# Patient Record
Sex: MALE | Race: BLACK OR AFRICAN AMERICAN | NOT HISPANIC OR LATINO | Employment: UNEMPLOYED | URBAN - METROPOLITAN AREA
[De-identification: names, ages, dates, MRNs, and addresses within clinical notes are randomized per-mention and may not be internally consistent; named-entity substitution may affect disease eponyms.]

---

## 2020-10-22 ENCOUNTER — HOSPITAL ENCOUNTER (EMERGENCY)
Facility: HOSPITAL | Age: 25
Discharge: HOME | End: 2020-10-22
Attending: EMERGENCY MEDICINE | Admitting: EMERGENCY MEDICINE

## 2020-10-22 VITALS
OXYGEN SATURATION: 98 % | WEIGHT: 315 LBS | SYSTOLIC BLOOD PRESSURE: 109 MMHG | HEART RATE: 87 BPM | TEMPERATURE: 96.2 F | DIASTOLIC BLOOD PRESSURE: 74 MMHG | RESPIRATION RATE: 16 BRPM

## 2020-10-22 DIAGNOSIS — R68.84 JAW PAIN: Primary | ICD-10-CM

## 2020-10-22 DIAGNOSIS — Z23 NEED FOR TETANUS BOOSTER: ICD-10-CM

## 2020-10-22 PROCEDURE — 99283 EMERGENCY DEPT VISIT LOW MDM: CPT

## 2020-10-22 PROCEDURE — 90471 IMMUNIZATION ADMIN: CPT

## 2020-10-22 PROCEDURE — 90715 TDAP VACCINE 7 YRS/> IM: CPT | Performed by: PHYSICIAN ASSISTANT

## 2020-10-22 PROCEDURE — 99282 EMERGENCY DEPT VISIT SF MDM: CPT | Performed by: PHYSICIAN ASSISTANT

## 2020-10-22 RX ADMIN — TETANUS TOXOID, REDUCED DIPHTHERIA TOXOID AND ACELLULAR PERTUSSIS VACCINE, ADSORBED 0.5 ML: 5; 2.5; 8; 8; 2.5 SUSPENSION INTRAMUSCULAR at 22:11

## 2023-04-30 ENCOUNTER — HOSPITAL ENCOUNTER (EMERGENCY)
Facility: HOSPITAL | Age: 28
Discharge: HOME | End: 2023-04-30
Attending: EMERGENCY MEDICINE

## 2023-04-30 VITALS
SYSTOLIC BLOOD PRESSURE: 128 MMHG | TEMPERATURE: 97.9 F | RESPIRATION RATE: 16 BRPM | OXYGEN SATURATION: 97 % | DIASTOLIC BLOOD PRESSURE: 64 MMHG | HEART RATE: 73 BPM

## 2023-04-30 DIAGNOSIS — Z77.098 CHEMICAL EXPOSURE: Primary | ICD-10-CM

## 2023-04-30 NOTE — Clinical Note
Mary Bethdavid Tali was seen and treated in our emergency department on 4/30/2023  Occupational Medicine Follow Up Within 24 hours            Diagnosis:     Marilynn Martinez    He may return on this date: If you have any questions or concerns, please don't hesitate to call        Sofia Dominguez MD    ______________________________           _______________          _______________  Hospital Representative                              Date                                Time

## 2023-05-01 NOTE — DISCHARGE INSTRUCTIONS
Your workup here was not concerning for anything dangerous  Therefore there is no need for you to stay at the hospital for further testing  We feel safe to send you home  You should follow up with Occupational health within 24 hrs  You can use Tylenol, Motrin for management of your symptoms  You should follow up with Tylenol, Motrin to assess for resolution of your symptoms and to determine if there is any further evaluation that needs to be performed  Return to the emergency department if you have any symptoms of worsening pain or new symptoms    Thank you for choosing [address] [place] [address] for your care!

## 2023-05-01 NOTE — ED PROVIDER NOTES
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 and came in contact with some sort of chemical that caused burning to his left 2nd, 3rd and 4th digit  Pt scrubbed the digits for about half an hour but had a hard time removing the chemical from his fingers  History of Present Illness and Review of Systems   This is a 24 y o  male with no significant PMH coming in today with complaint of chemical burn  He reports that he was working as a  and came in contact with multiple chemicals at his work  He it impacted the tips of his left third second and fourth digits  He noted some pain immediately at that time  He immediately degloved and changes close, and irrigated his hands  He notes some residual burning sensation at the tips of his fingers  Denies any inhalational exposure  Denies any eye exposure  Denies any for hydrofluoric acid exposure  Does not feel any palpitations, no episodes of syncope  He is otherwise healthy, no chronic medical problems  No other symptoms currently  He does have an occupational person at work that he can follow-up with  In reviewing the safety data sheet, it appears that most of the chemicals that he may have been exposed to are alkaline  He is unable to note exactly which one as he worked with multiple chemicals  Most likely, related to a 10% hydrogen peroxide powder  Remainder of ROS Reviewed and Non-Pertinent    No other complaints for this encounter     - No language barrier    - History obtained from patient and chart   - Reviewed relevant past medical/family/social history  - There are no limitations to the history obtained  Past Medical, Past Surgical History:    has no past medical history on file  has no past surgical history on file       Allergies:   No Known Allergies    Social and Family History:     Social History Substance and Sexual Activity   Alcohol Use Never     Social History     Tobacco Use   Smoking Status Every Day   Smokeless Tobacco Never   Tobacco Comments    smokes Hooka     Social History     Substance and Sexual Activity   Drug Use Yes   • Types: Marijuana    Comment: medical card       Physical Examination     Vitals:    04/30/23 2049   BP: 128/64   BP Location: Left arm   Pulse: 73   Resp: 16   Temp: 97 9 °F (36 6 °C)   TempSrc: Temporal   SpO2: 97%       Physical Exam: Overall he does have minor amount of residual white powder on the tip of his left third digit, however his hand examination is benign overall, has normal sensation in all digits of his left hand, no evidence of contractures, burns, abrasions, no evidence of crepitus, range of motion is intact, no photophobia, no ocular abnormalities, no tearing benign cardiorespiratory exam, no neurologic deficits      Risk Stratification Tools                No orders of the defined types were placed in this encounter  Labs:   Labs Reviewed - No data to display    Imaging:     No orders to display          Procedures   Procedures      MDM:   Medical Decision Making  Leticiabruce Zapata is a 24 y o  who presents with complaints of caustic burn    Vital signs are unremarkable, physical exam shows the patient is well-appearing, he has benign examination of his left hand and otherwise,    Dx: Caustic exposure    Plan: Copious irrigation, work note, no indication for calcium gluconate therapy or otherwise  He has no evidence of caustic burn that would require therapy  Therefore felt safe sent home  Advised on return precautions and recommended close follow up  Final Dispo   Final Diagnosis:  1   Chemical exposure      Time reflects when diagnosis was documented in both MDM as applicable and the Disposition within this note     Time User Action Codes Description Comment    4/30/2023  9:21 PM Erick Pals Add [I97 301] Chemical "exposure       ED Disposition     ED Disposition   Discharge    Condition   Stable    Date/Time   Sun Apr 30, 2023  9:20 PM    [address] discharge to home/self care  Follow-up Information    None       Medications - No data to display    All details of the evaluation and treatment plan were made clear and additionally all questions and concerns were addressed while under my care  Portions of the record may have been created with voice recognition software  Occasional wrong word or \"sound a like\" substitutions may have occurred due to the inherent limitations of voice recognition software  Read the chart carefully and recognize, using context, where substitutions have occurred  The attending physician physically available and evaluated the above patient alongside myself          Ciro Alexander MD  05/01/23 2093    "

## 2023-05-03 ENCOUNTER — APPOINTMENT (OUTPATIENT)
Dept: URGENT CARE | Age: 28
End: 2023-05-03
Payer: OTHER MISCELLANEOUS

## 2023-05-03 PROCEDURE — 99283 EMERGENCY DEPT VISIT LOW MDM: CPT | Performed by: PHYSICIAN ASSISTANT

## 2023-05-03 PROCEDURE — G0382 LEV 3 HOSP TYPE B ED VISIT: HCPCS | Performed by: PHYSICIAN ASSISTANT

## 2023-11-27 ENCOUNTER — HOSPITAL ENCOUNTER (EMERGENCY)
Facility: HOSPITAL | Age: 28
Discharge: HOME | End: 2023-11-27
Attending: EMERGENCY MEDICINE
Payer: OTHER MISCELLANEOUS

## 2023-11-27 VITALS
TEMPERATURE: 98.8 F | HEART RATE: 74 BPM | SYSTOLIC BLOOD PRESSURE: 135 MMHG | DIASTOLIC BLOOD PRESSURE: 63 MMHG | OXYGEN SATURATION: 100 % | RESPIRATION RATE: 18 BRPM

## 2023-11-27 DIAGNOSIS — S09.90XA INJURY OF HEAD, INITIAL ENCOUNTER: Primary | ICD-10-CM

## 2023-11-27 PROCEDURE — 99284 EMERGENCY DEPT VISIT MOD MDM: CPT | Performed by: EMERGENCY MEDICINE

## 2023-11-27 PROCEDURE — 99282 EMERGENCY DEPT VISIT SF MDM: CPT

## 2023-11-27 RX ORDER — IBUPROFEN 600 MG/1
600 TABLET ORAL ONCE
Status: COMPLETED | OUTPATIENT
Start: 2023-11-27 | End: 2023-11-27

## 2023-11-27 RX ORDER — ACETAMINOPHEN 325 MG/1
975 TABLET ORAL ONCE
Status: COMPLETED | OUTPATIENT
Start: 2023-11-27 | End: 2023-11-27

## 2023-11-27 RX ADMIN — IBUPROFEN 600 MG: 600 TABLET ORAL at 02:03

## 2023-11-27 RX ADMIN — ACETAMINOPHEN 975 MG: 325 TABLET, FILM COATED ORAL at 02:02

## 2023-11-27 NOTE — ED ATTENDING ATTESTATION
11/27/2023  ILuisa MD, saw and evaluated the patient. I have discussed the patient with the resident/non-physician practitioner and agree with the resident's/non-physician practitioner's findings, Plan of Care, and MDM as documented in the resident's/non-physician practitioner's note, except where noted. All available labs and Radiology studies were reviewed. I was present for key portions of any procedure(s) performed by the resident/non-physician practitioner and I was immediately available to provide assistance. At this point I agree with the current assessment done in the Emergency Department. I have conducted an independent evaluation of this patient a history and physical is as follows:    ED Course  ED Course as of 11/27/23 0209   [facility] Nov 27, 2023   0155 Per resident h&p 35 YO M hit in the head by pipe at [employer]; no LOC; mild HA O: atraumatic cranial exam; NL neurological exam; tandem gait NL I/P apap, ibuprofen; f/u occumed   Emergency Department Note- Virgen Mendoza 34 y.o. male MRN: [id number]    Unit/Bed#: ED 15 Encounter: [id number]    Virgen Mendoza is a 34 y.o. male who presents with   Chief Complaint   Patient presents with    Head Injury     Pt. Was at work when he was hit in the head with a metal pole, - LOC, - thinners         History of Present Illness   HPI:  Virgen Mendoza is a 34 y.o. male who presents for evaluation of:  Head trauma while at work. The patient was at work at [employer] when he was struck in the head by a pipe accidentally. He denies loss of consciousness, headache, and syncope. He denies any sort of bleeding from the area where he was struck in the head. He denies associated neck pain and back pain. Review of Systems   Constitutional:  Negative for fatigue and fever. HENT:  Negative for congestion and sore throat. Respiratory:  Negative for cough and shortness of breath.     Cardiovascular:  Negative for chest pain and palpitations. Gastrointestinal:  Negative for abdominal pain and nausea. Genitourinary:  Negative for flank pain and frequency. Neurological:  Positive for headaches. Negative for light-headedness. Psychiatric/Behavioral:  Negative for dysphoric mood and hallucinations. All other systems reviewed and are negative. Historical Information   History reviewed. No pertinent past medical history. History reviewed. No pertinent surgical history. Social History   Social History     Substance and Sexual Activity   Alcohol Use Never     Social History     Substance and Sexual Activity   Drug Use Yes    Types: Marijuana    Comment: medical card     Social History     Tobacco Use   Smoking Status Every Day   Smokeless Tobacco Never   Tobacco Comments    smokes Hooka     Family History: History reviewed. No pertinent family history. Meds/Allergies   PTA meds:   None     No Known Allergies    Objective   First Vitals:   Blood Pressure: 135/63 (11/27/23 0122)  Pulse: 74 (11/27/23 0122)  Temperature: 98.8 °F (37.1 °C) (11/27/23 0122)  Temp Source: Oral (11/27/23 0122)  Respirations: 18 (11/27/23 0122)  SpO2: 100 % (11/27/23 0122)    Current Vitals:   Blood Pressure: 135/63 (11/27/23 0122)  Pulse: 74 (11/27/23 0122)  Temperature: 98.8 °F (37.1 °C) (11/27/23 0122)  Temp Source: Oral (11/27/23 0122)  Respirations: 18 (11/27/23 0122)  SpO2: 100 % (11/27/23 0122)    No intake or output data in the 24 hours ending 11/27/23 0209    Invasive Devices       None                   Physical Exam  Vitals and nursing note reviewed. Constitutional:       General: He is not in acute distress. Appearance: Normal appearance. He is well-developed. HENT:      Head: Normocephalic and atraumatic. Right Ear: External ear normal.      Left Ear: External ear normal.      Nose: Nose normal.      Mouth/Throat:      Pharynx: No oropharyngeal exudate.    Eyes:      Conjunctiva/sclera: Conjunctivae normal. Pupils: Pupils are equal, round, and reactive to light. Cardiovascular:      Rate and Rhythm: Normal rate and regular rhythm. Pulmonary:      Effort: Pulmonary effort is normal. No respiratory distress. Abdominal:      General: Abdomen is flat. There is no distension. Palpations: Abdomen is soft. Musculoskeletal:         General: No deformity. Normal range of motion. Cervical back: Normal range of motion and neck supple. Skin:     General: Skin is warm and dry. Capillary Refill: Capillary refill takes less than 2 seconds. Neurological:      General: No focal deficit present. Mental Status: He is alert and oriented to person, place, and time. Mental status is at baseline. Coordination: Coordination normal.   Psychiatric:         Mood and Affect: Mood normal.         Behavior: Behavior normal.         Thought Content: Thought content normal.         Judgment: Judgment normal.           Medical Decision Makin. Closed head trauma: Normal exam; symptomatic treatment with acetaminophen and ibuprofen. Follow-up with occupational medicine as an outpatient. No results found for this or any previous visit (from the past 36 hour(s)). No orders to display         Portions of the record may have been created with voice recognition software. Occasional wrong word or "sound a like" substitutions may have occurred due to the inherent limitations of voice recognition software. Read the chart carefully and recognize, using context, where substitutions have occurred.           Critical Care Time  Procedures

## 2023-11-27 NOTE — DISCHARGE INSTRUCTIONS
Please take Tylenol and Motrin as needed for head pain. Please follow-up with occupational medicine. Please return to the emergency department if develop any new or concerning symptoms including severe headache, severe vomiting, or vision changes.

## 2023-11-27 NOTE — Clinical Note
Annie Watauga was seen and treated in our emergency department on 11/27/2023. Diagnosis:     Dmitry Uribe  may return to work on return date. He may return on this date: 11/29/2023         If you have any questions or concerns, please don't hesitate to call.       Lisa Zurita MD    ______________________________           _______________          _______________  Hospital Representative                              Date                                Time

## 2023-11-27 NOTE — ED PROVIDER NOTES
History  Chief Complaint   Patient presents with    Head Injury     Pt. Was at work when he was hit in the head with a metal pole, - LOC, - thinners     Patient is a 80-year-old male who presents with injury. Patient states that he was at work at [employer] and was handling a metal pipe. He says that another pipe that was connected to it became dislodged and swung down and hit him on the left side of his head. He denies loss of conscious. He states that he has had a pain on the left side of his head and in his frontal head. He also endorses photophobia but denies any tinnitus, decreased hearing, nausea, vomiting, weakness, numbness, tingling. Patient was sent in by his employer for evaluation. He denies being on blood thinners. None       History reviewed. No pertinent past medical history. History reviewed. No pertinent surgical history. History reviewed. No pertinent family history. I have reviewed and agree with the history as documented. E-Cigarette/Vaping     E-Cigarette/Vaping Substances     Social History     Tobacco Use    Smoking status: Every Day    Smokeless tobacco: Never    Tobacco comments:     smokes Hooka   Substance Use Topics    Alcohol use: Never    Drug use: Yes     Types: Marijuana     Comment: medical card            Physical Exam  ED Triage Vitals [11/27/23 0122]   Temperature Pulse Respirations Blood Pressure SpO2   98.8 °F (37.1 °C) 74 18 135/63 100 %      Temp Source Heart Rate Source Patient Position - Orthostatic VS BP Location FiO2 (%)   Oral Monitor Sitting Right arm --      Pain Score       6             Orthostatic Vital Signs  Vitals:    11/27/23 0122   BP: 135/63   Pulse: 74   Patient Position - Orthostatic VS: Sitting       Physical Exam  Vitals and nursing note reviewed. Constitutional:       General: He is not in acute distress. Appearance: Normal appearance. He is well-developed. He is obese. He is not ill-appearing, toxic-appearing or diaphoretic. HENT:      Head: Normocephalic and atraumatic. Comments: No raccoon eyes, no garcia sign, no appreciable skull fractures, no hematomas or lacerations, no hemotympanum. Right Ear: Tympanic membrane, ear canal and external ear normal.      Left Ear: Tympanic membrane, ear canal and external ear normal.      Nose: Nose normal. No congestion or rhinorrhea. Mouth/Throat:      Mouth: Mucous membranes are moist.      Pharynx: Oropharynx is clear. No oropharyngeal exudate or posterior oropharyngeal erythema. Eyes:      General: No scleral icterus. Right eye: No discharge. Left eye: No discharge. Extraocular Movements: Extraocular movements intact. Conjunctiva/sclera: Conjunctivae normal.      Pupils: Pupils are equal, round, and reactive to light. Cardiovascular:      Rate and Rhythm: Normal rate and regular rhythm. Pulses: Normal pulses. Heart sounds: Normal heart sounds. No murmur heard. No friction rub. No gallop. Pulmonary:      Effort: Pulmonary effort is normal. No respiratory distress. Breath sounds: Normal breath sounds. No stridor. No wheezing, rhonchi or rales. Abdominal:      General: Abdomen is flat. Bowel sounds are normal. There is no distension. Palpations: Abdomen is soft. Tenderness: There is no abdominal tenderness. There is no right CVA tenderness, left CVA tenderness or guarding. Musculoskeletal:         General: No tenderness. Cervical back: Normal range of motion and neck supple. No rigidity or tenderness. Right lower leg: No edema. Left lower leg: No edema. Skin:     General: Skin is warm and dry. Capillary Refill: Capillary refill takes less than 2 seconds. Coloration: Skin is not jaundiced or pale. Neurological:      General: No focal deficit present. Mental Status: He is alert and oriented to person, place, and time. Cranial Nerves: No cranial nerve deficit.       Sensory: No sensory deficit. Motor: No weakness. Coordination: Coordination normal.      Gait: Gait normal.      Comments: Cranial nerves II through XII grossly intact. Sensation and strength grossly intact in the bilateral upper and lower extremities. Coordination intact with finger-nose and heel-to-shin testing. Patient can ambulate normally and performs tandem gait appropriately. Psychiatric:         Mood and Affect: Mood normal.         Behavior: Behavior normal.         ED Medications  Medications   ibuprofen (MOTRIN) tablet 600 mg (600 mg Oral Given 11/27/23 0203)   acetaminophen (TYLENOL) tablet 975 mg (975 mg Oral Given 11/27/23 0202)       Diagnostic Studies  Results Reviewed       None                   No orders to display         Procedures  Procedures      ED Course                             SBIRT 20yo+      Flowsheet Row Most Recent Value   Initial Alcohol Screen: US AUDIT-C     1. How often do you have a drink containing alcohol? 0 Filed at: 11/27/2023 0129   2. How many drinks containing alcohol do you have on a typical day you are drinking? 0 Filed at: 11/27/2023 0129   3a. Male UNDER 65: How often do you have five or more drinks on one occasion? 0 Filed at: 11/27/2023 0129   Audit-C Score 0 Filed at: 11/27/2023 0129   ADRIANA: How many times in the past year have you. .. Used an illegal drug or used a prescription medication for non-medical reasons? Never Filed at: 11/27/2023 0129                  Medical Decision Making  Patient is a 65-year-old male who presents for head injury. DDx includes not limited to concussion, contusion. I doubt intracranial traumatic injury such as epidural hematoma, subdural hematoma, intraparenchymal hemorrhage, or subarachnoid hemorrhage given patient's benign exam and relatively benign mechanism. Patient initially in a c-collar, he is medically cleared. I do not see indication for imaging at this time. We will give patient Tylenol and Motrin for pain.   We will provide a referral to occupational medicine for possible concussion and for work-related injury. Strict return precautions given, all questions answered. Risk  OTC drugs. Prescription drug management. Disposition  Final diagnoses:   Injury of head, initial encounter     Time reflects when diagnosis was documented in both MDM as applicable and the Disposition within this note       Time User Action Codes Description Comment    11/27/2023  1:57 AM Shira Regalado Add [S09.90XA] Injury of head, initial encounter           ED Disposition       ED Disposition   Discharge    Condition   Stable    Date/Time   Mon Nov 27, 2023 1900 [facility] discharge to home/self care. Follow-up Information       Follow up With Specialties Details Why Contact Info Additional [address] [facility] Emergency Department Emergency Medicine Go to  If symptoms worsen or if you have any other specific concerns [phone]  [phone]  [facility] Emergency Department, [address], [place], [address] [facility] Family Medicine Call today To make appointment for reevaluation if you do not have a PCP [facility] [phone]  [address] [place] [address], [address] [place] [address], [address] [place] [address]  Schedule an appointment as soon as possible for a visit   [address] [address]  [phone]             There are no discharge medications for this patient. PDMP Review       None             ED Provider  Attending physically available and evaluated Komal Winslow. I managed the patient along with the ED Attending.     Electronically Signed by Mumtaz Rucker MD  11/27/23 0012